# Patient Record
Sex: FEMALE | Race: WHITE | Employment: FULL TIME | URBAN - METROPOLITAN AREA
[De-identification: names, ages, dates, MRNs, and addresses within clinical notes are randomized per-mention and may not be internally consistent; named-entity substitution may affect disease eponyms.]

---

## 2021-02-27 ENCOUNTER — APPOINTMENT (OUTPATIENT)
Dept: GENERAL RADIOLOGY | Age: 36
End: 2021-02-27

## 2021-02-27 ENCOUNTER — HOSPITAL ENCOUNTER (EMERGENCY)
Age: 36
Discharge: HOME | End: 2021-02-27
Attending: EMERGENCY MEDICINE

## 2021-02-27 VITALS
OXYGEN SATURATION: 95 % | HEART RATE: 90 BPM | BODY MASS INDEX: 31.13 KG/M2 | TEMPERATURE: 98.7 F | DIASTOLIC BLOOD PRESSURE: 76 MMHG | SYSTOLIC BLOOD PRESSURE: 112 MMHG | RESPIRATION RATE: 14 BRPM | WEIGHT: 182.32 LBS | HEIGHT: 64 IN

## 2021-02-27 DIAGNOSIS — S82.832A OTHER CLOSED FRACTURE OF DISTAL END OF LEFT FIBULA, INITIAL ENCOUNTER: Primary | ICD-10-CM

## 2021-02-27 PROCEDURE — 99284 EMERGENCY DEPT VISIT MOD MDM: CPT

## 2021-02-27 PROCEDURE — 73610 X-RAY EXAM OF ANKLE: CPT

## 2021-02-27 PROCEDURE — 29125 APPL SHORT ARM SPLINT STATIC: CPT

## 2021-02-27 PROCEDURE — 73630 X-RAY EXAM OF FOOT: CPT

## 2021-02-27 PROCEDURE — 27786 TREATMENT OF ANKLE FRACTURE: CPT

## 2021-02-27 RX ORDER — IBUPROFEN 600 MG/1
600 TABLET ORAL EVERY 6 HOURS PRN
Qty: 30 TABLET | Refills: 0 | Status: SHIPPED | OUTPATIENT
Start: 2021-02-27 | End: 2022-07-19

## 2021-02-27 ASSESSMENT — PAIN SCALES - GENERAL
PAINLEVEL_OUTOF10: 5
PAINLEVEL_OUTOF10: 5

## 2021-02-27 ASSESSMENT — PAIN DESCRIPTION - LOCATION: LOCATION: ANKLE

## 2021-02-27 ASSESSMENT — PAIN - FUNCTIONAL ASSESSMENT: PAIN_FUNCTIONAL_ASSESSMENT: 0-10

## 2021-02-27 NOTE — ED PROVIDER NOTES
[facility][1]  eMERGENCY dEPARTMENT eNCOUnter      Pt Name: Marylee Felty  MRN: [id number]  [place] 1985  Date of evaluation: 2/27/2021  Provider: Jorge Calix MD    [address] [place] [address]       Chief Complaint   Patient presents with    Ankle Pain     left ankle injury sustained last night after falling while roller skating \"heard a pop\"         HISTORY OF PRESENT ILLNESS  (Location/Symptom, Timing/Onset, Context/Setting, Quality, Duration, Modifying Factors, Severity.)   Marylee Felty is a 37 y.o. female who presents to the emergency department having injury to her left ankle that she sustained last night while rollerskating. She fell, she twisted her ankle, she felt a pop. She has pain and swelling in her left ankle. The pain is worse on the outer aspect of her ankle. She has no numbness or tingling. No knee pain. No other associated injuries from the fall. He has not taken anything for pain today. Nursing Notes were reviewed and I agree. REVIEW OF SYSTEMS    (2-9 systems for level 4, 10 or more for level 5)     Musculoskeletal: Left ankle pain and swelling. No knee pain. No foot pain. Skin: Bruising over the lateral left ankle. No lacerations. Neuro: No extremity weakness numbness or tingling. Except as noted above the remainder of the review of systems was reviewed and negative. PAST MEDICAL HISTORY   History reviewed. No pertinent past medical history. SURGICAL HISTORY           Procedure Laterality Date    TONSILLECTOMY         CURRENT MEDICATIONS       Previous Medications    No medications on file       ALLERGIES     Patient has no known allergies. FAMILY HISTORY     History reviewed. No pertinent family history. No family status information on file. SOCIAL HISTORY      reports that she has quit smoking. She smoked 0.50 packs per day.  She has never used smokeless tobacco. She reports that she does not drink alcohol or use drugs. PHYSICAL EXAM    (up to 7 for level 4, 8 or more for level 5)     ED Triage Vitals [02/27/21 1031]   BP Temp Temp Source Pulse Resp SpO2 Height Weight   112/76 98.7 °F (37.1 °C) Oral 90 14 95 % 5' 4\" (1.626 m) 182 lb 5.1 oz (82.7 kg)       Dental: Alert white female no acute distress. Musculoskeletal: Left knee is nontender without swelling, full range of motion. The proximal mid left tib-fib is nontender without swelling or deformity. There is moderate diffuse swelling over the lateral ankle on the left. There is significant tenderness of the distal fibula. The medial ankle is minimally tender without swelling. She has limited dorsiflexion and plantarflexion of the foot secondary to pain and swelling. There is some moderate tenderness at the base of the fourth and fifth metatarsals in the foot. There is no other foot tenderness including no calcaneus tenderness. He has intact distal pulses. Skin: Warm and dry, good turgor. There is some faint bruising over the lateral ankle. There is no lacerations or abrasions. No erythema. Neuro: Intact motor function sensation left lower extremity with the exception of range of motion of the left ankle as noted above. DIAGNOSTIC RESULTS     RADIOLOGY:   Non-plain film images such as CT, Ultrasound and MRI are read by the radiologist. Plain radiographic images are visualized and preliminarily interpreted by Alfie Trotter MD with the below findings:      Interpretation per the Radiologist below, if available at the time of this note:    XR FOOT LEFT (MIN 3 VIEWS)   Final Result   Acute, minimally displaced fracture of the distal fibula. No acute osseous injury of the left foot. XR ANKLE LEFT (MIN 3 VIEWS)   Final Result   Acute, minimally displaced fracture of the distal fibula. No acute osseous injury of the left foot.              LABS:  Labs Reviewed - No data to display    All other labs were within normal range or not returned as of this dictation. EMERGENCY DEPARTMENT COURSE and DIFFERENTIAL DIAGNOSIS/MDM:   Vitals:    Vitals:    02/27/21 1031   BP: 112/76   Pulse: 90   Resp: 14   Temp: 98.7 °F (37.1 °C)   TempSrc: Oral   SpO2: 95%   Weight: 182 lb 5.1 oz (82.7 kg)   Height: 5' 4\" (1.626 m)       This patient injured her left ankle rollerskating yesterday evening. She has tenderness and swelling primarily over the lateral ankle in the area of the distal fibula. Her x-ray shows findings consistent with a minimally displaced distal fibular fracture. She has intact distal neurovascular status. She has no other fractures noted on her ankle or foot x-ray. She was placed in an Ortho-Glass posterior splint by me. She states she will use ibuprofen or Tylenol for pain. She will be given a referral to orthopedic on-call for follow-up for definitive treatment of her fracture. She will be placed on crutches, nonweightbearing, ice and elevate, Tylenol or ibuprofen as needed for pain. X-ray results, diagnosis, and treatment plan were discussed with the patient. She understands her treatment plan and follow-up as discussed. PROCEDURES:  Splint Application    Date/Time: 2/27/2021 11:52 AM  Performed by: Yady Yap MD  Authorized by: Yady Yap MD     Consent:     Consent obtained:  Verbal    Consent given by:  Patient    Risks discussed:  Discoloration, numbness, pain and swelling  Pre-procedure details:     Sensation:  Normal    Skin color:  Bruising  Procedure details:     Laterality:  Left    Location:  Ankle    Ankle:  L ankle    Splint type:  Short leg    Supplies:  Elastic bandage, Ortho-Glass and cotton padding  Post-procedure details:     Pain:  Improved    Sensation:  Normal    Skin color:  Bruising / unchanged    Patient tolerance of procedure: Tolerated well, no immediate complications          FINAL IMPRESSION      1.  Other closed fracture of distal end of left fibula, initial encounter DISPOSITION/PLAN   DISPOSITION Decision To Discharge 02/27/2021 11:47:50 AM      PATIENT REFERRED TO:  Elisa Bonilla MD  [address] [place] [address]  [address] [place] [address]  [phone]    Schedule an appointment as soon as possible for a visit in 3 days        DISCHARGE MEDICATIONS:  New Prescriptions    IBUPROFEN (ADVIL;MOTRIN) 600 MG TABLET    Take 1 tablet by mouth every 6 hours as needed for Pain       (Please note that portions of this note were completed with a voice recognition program.  Efforts were made to edit the dictations but occasionally words are mis-transcribed.)    Starr Alejandre MD  Attending Emergency Physician        Obdulia Sancehz MD  02/27/21 1561

## 2021-02-27 NOTE — ED NOTES
Discharge and education instructions reviewed. Patient verbalized understanding, teach back successful. Patient denied questions at this time. Instructed to follow up with PCP and or return to ED if symptoms worsen. To car per wheelchair with splint on and crutches.  Pain subsided with splint on 4/10     Vicki Penny RN  02/27/21 6872

## 2021-03-01 ENCOUNTER — TELEPHONE (OUTPATIENT)
Dept: ORTHOPEDIC SURGERY | Age: 36
End: 2021-03-01

## 2021-03-01 NOTE — TELEPHONE ENCOUNTER
I left a message for the patient to call back and schedule an apt with Dr. Laron Loza for her left ankle. Please schedule when she calls back.

## 2022-07-19 ENCOUNTER — HOSPITAL ENCOUNTER (EMERGENCY)
Age: 37
Discharge: HOME | End: 2022-07-19
Attending: EMERGENCY MEDICINE
Payer: MEDICAID

## 2022-07-19 VITALS
HEIGHT: 64 IN | OXYGEN SATURATION: 98 % | TEMPERATURE: 99.7 F | WEIGHT: 210.76 LBS | BODY MASS INDEX: 35.98 KG/M2 | SYSTOLIC BLOOD PRESSURE: 120 MMHG | RESPIRATION RATE: 17 BRPM | DIASTOLIC BLOOD PRESSURE: 76 MMHG | HEART RATE: 103 BPM

## 2022-07-19 DIAGNOSIS — U07.1 COVID-19: Primary | ICD-10-CM

## 2022-07-19 PROCEDURE — 99283 EMERGENCY DEPT VISIT LOW MDM: CPT

## 2022-07-19 ASSESSMENT — PAIN SCALES - GENERAL
PAINLEVEL_OUTOF10: 5
PAINLEVEL_OUTOF10: 5

## 2022-07-19 ASSESSMENT — PAIN DESCRIPTION - PAIN TYPE: TYPE: ACUTE PAIN

## 2022-07-19 ASSESSMENT — PAIN DESCRIPTION - DESCRIPTORS: DESCRIPTORS: ACHING

## 2022-07-19 ASSESSMENT — PAIN - FUNCTIONAL ASSESSMENT
PAIN_FUNCTIONAL_ASSESSMENT: 0-10
PAIN_FUNCTIONAL_ASSESSMENT: 0-10

## 2022-07-19 ASSESSMENT — PAIN DESCRIPTION - FREQUENCY: FREQUENCY: CONTINUOUS

## 2022-07-19 ASSESSMENT — PAIN DESCRIPTION - LOCATION: LOCATION: HEAD;GENERALIZED

## 2022-07-19 NOTE — Clinical Note
Chaparrita Lucas was seen and treated in our emergency department on 7/19/2022. She may return to work on 07/25/2022. If you have any questions or concerns, please don't hesitate to call.       Rod Marrero MD

## 2022-07-19 NOTE — ED PROVIDER NOTES
CHIEF COMPLAINT  Chief Complaint   Patient presents with    Positive For Covid-19     Pt states she started with a headache yesterday and developed a cough and body aches throughout the rest of the day and night. Pt took home covid test and it was positive. Pt states she needs note and paperwork for work. HISTORY OF PRESENT ILLNESS  Moo Naranjo is a 30 y.o. female who presents to the ED complaining of a 2-day history of headache, myalgias, cough, fatigue, mild sore throat and postnasal drainage with nasal congestion. Patient took a COVID test this morning which was positive and needs a work excuse. Patient reports no shortness of breath or chest pain. No visual changes. No loss of smell or taste. No rash. No vomiting or diarrhea. Patient is pregnant at 22 weeks but has had good fetal movements with no vaginal bleeding. No abdominal pain. No other complaints, modifying factors or associated symptoms. Nursing notes reviewed. History reviewed. No pertinent past medical history. Past Surgical History:   Procedure Laterality Date    TONSILLECTOMY       History reviewed. No pertinent family history.   Social History     Socioeconomic History    Marital status: Single     Spouse name: Not on file    Number of children: Not on file    Years of education: Not on file    Highest education level: Not on file   Occupational History    Not on file   Tobacco Use    Smoking status: Former     Packs/day: 0.50     Types: Cigarettes    Smokeless tobacco: Never   Substance and Sexual Activity    Alcohol use: No    Drug use: No    Sexual activity: Yes     Partners: Male   Other Topics Concern    Not on file   Social History Narrative    Not on file     Social Determinants of Health     Financial Resource Strain: Not on file   Food Insecurity: Not on file   Transportation Needs: Not on file   Physical Activity: Not on file   Stress: Not on file   Social Connections: Not on file   Intimate Partner Violence: Not on file   Housing Stability: Not on file     No current facility-administered medications for this encounter. Current Outpatient Medications   Medication Sig Dispense Refill    Prenatal Vit-Fe Fumarate-FA (PRENATAL VITAMIN AND MINERAL PO) Take by mouth       No Known Allergies    REVIEW OF SYSTEMS  Positives and pertinent negatives as per HPI. Six other systems were reviewed and are negative. Nursing notes pertaining to ROS were reviewed. PHYSICAL EXAM   /76   Pulse (!) 103   Temp 99.7 °F (37.6 °C)   Resp 17   Ht 5' 4\" (1.626 m)   Wt 210 lb 12.2 oz (95.6 kg)   SpO2 98%   BMI 36.18 kg/m²   GENERAL APPEARANCE: Awake and alert. Cooperative. No acute distress. No increased work of breathing or accessory muscle use. HEAD: Normocephalic. Atraumatic. EYES: PERRL. EOM's grossly intact. No scleral icterus, injection or exudate. ENT: Mucous membranes are moist.  TMs are clear bilaterally. Oral cavity is clear without tonsillar hypertrophy or exudate. No palatal petechiae. No frontal or maxillary sinus tenderness to palpation. Nasal MM are clear. NECK: Supple. Normal ROM. No cervical lymphadenopathy. CHEST:  Regular rate and rhythm, no murmurs, rubs or gallops. LUNGS: Breathing is unlabored. Speaking comfortably in full sentences. Clear through auscultation bilaterally  ABDOMEN: Nondistended, nontender. EXTREMITIES: MAEE. No acute deformities. SKIN: Warm and dry. No rash  NEUROLOGICAL: Alert and oriented. ED COURSE/MDM  COVID-19 with no hypoxia, tachypnea, symptoms of shortness of breath, chest pain. Patient's outpatient risk of decompensation score within 24 hours is 2 and patient is deemed reasonable for outpatient management at this time. Paxlovid  is not indicated for this patient given that she is pregnant and otherwise low risk. Patient was advised to return to the emergency any worsening symptoms. Tylenol and Mucinex DM were recommended as needed.   Push fluids. Patient was given scripts for the following medications. I counseled patient how to take these medications. New Prescriptions    No medications on file         CLINICAL IMPRESSION  1. COVID-19        Blood pressure 120/76, pulse (!) 103, temperature 99.7 °F (37.6 °C), resp. rate 17, height 5' 4\" (1.626 m), weight 210 lb 12.2 oz (95.6 kg), SpO2 98 %.       Follow-up with:  [address] [place] [address]              Vee Anthony MD  07/19/22 8521